# Patient Record
Sex: FEMALE | Race: BLACK OR AFRICAN AMERICAN | NOT HISPANIC OR LATINO | Employment: PART TIME | URBAN - METROPOLITAN AREA
[De-identification: names, ages, dates, MRNs, and addresses within clinical notes are randomized per-mention and may not be internally consistent; named-entity substitution may affect disease eponyms.]

---

## 2018-07-11 ENCOUNTER — OFFICE VISIT (OUTPATIENT)
Dept: UROLOGY | Facility: CLINIC | Age: 63
End: 2018-07-11
Payer: COMMERCIAL

## 2018-07-11 VITALS
HEIGHT: 65 IN | RESPIRATION RATE: 15 BRPM | DIASTOLIC BLOOD PRESSURE: 98 MMHG | BODY MASS INDEX: 39.92 KG/M2 | WEIGHT: 239.63 LBS | SYSTOLIC BLOOD PRESSURE: 142 MMHG

## 2018-07-11 DIAGNOSIS — N32.81 OAB (OVERACTIVE BLADDER): Primary | ICD-10-CM

## 2018-07-11 DIAGNOSIS — R03.0 ELEVATED BLOOD PRESSURE READING: ICD-10-CM

## 2018-07-11 DIAGNOSIS — N39.46 MIXED STRESS AND URGE URINARY INCONTINENCE: ICD-10-CM

## 2018-07-11 DIAGNOSIS — R35.1 NOCTURIA: ICD-10-CM

## 2018-07-11 PROBLEM — R39.15 URINARY URGENCY: Status: ACTIVE | Noted: 2018-07-11

## 2018-07-11 LAB
BILIRUB SERPL-MCNC: NORMAL MG/DL
BLOOD URINE, POC: NORMAL
COLOR, POC UA: YELLOW
GLUCOSE UR QL STRIP: NORMAL
KETONES UR QL STRIP: NORMAL
LEUKOCYTE ESTERASE URINE, POC: NORMAL
NITRITE, POC UA: NORMAL
PH, POC UA: NORMAL
POC RESIDUAL URINE VOLUME: 0 ML (ref 0–100)
PROTEIN, POC: NORMAL
SPECIFIC GRAVITY, POC UA: 1015
UROBILINOGEN, POC UA: 1

## 2018-07-11 PROCEDURE — 81001 URINALYSIS AUTO W/SCOPE: CPT | Mod: S$GLB,,, | Performed by: NURSE PRACTITIONER

## 2018-07-11 PROCEDURE — 3008F BODY MASS INDEX DOCD: CPT | Mod: CPTII,S$GLB,, | Performed by: NURSE PRACTITIONER

## 2018-07-11 PROCEDURE — 99999 PR PBB SHADOW E&M-NEW PATIENT-LVL III: CPT | Mod: PBBFAC,,, | Performed by: NURSE PRACTITIONER

## 2018-07-11 PROCEDURE — 99204 OFFICE O/P NEW MOD 45 MIN: CPT | Mod: 25,S$GLB,, | Performed by: NURSE PRACTITIONER

## 2018-07-11 PROCEDURE — 51798 US URINE CAPACITY MEASURE: CPT | Mod: S$GLB,,, | Performed by: NURSE PRACTITIONER

## 2018-07-11 RX ORDER — METOPROLOL TARTRATE 100 MG/1
TABLET ORAL
Refills: 1 | COMMUNITY
Start: 2018-06-27

## 2018-07-11 RX ORDER — OXYBUTYNIN CHLORIDE 5 MG/1
5 TABLET, EXTENDED RELEASE ORAL DAILY
Qty: 30 TABLET | Refills: 11 | Status: SHIPPED | OUTPATIENT
Start: 2018-07-11

## 2018-07-11 RX ORDER — GABAPENTIN 400 MG/1
CAPSULE ORAL
Refills: 2 | COMMUNITY
Start: 2018-06-24

## 2018-07-11 RX ORDER — ATORVASTATIN CALCIUM 20 MG/1
TABLET, FILM COATED ORAL
Refills: 1 | COMMUNITY
Start: 2018-05-19

## 2018-07-11 RX ORDER — ASPIRIN 81 MG/1
81 TABLET ORAL DAILY
COMMUNITY

## 2018-07-11 RX ORDER — CLONIDINE HYDROCHLORIDE 0.3 MG/1
TABLET ORAL
Refills: 1 | COMMUNITY
Start: 2018-05-15

## 2018-07-11 NOTE — PATIENT INSTRUCTIONS
Kegel Exercises  Kegel exercises dont need special clothing or equipment. Theyre easy to learn and simple to do. And if you do them right, no one can tell youre doing them, so they can be done almost anywhere. Your healthcare provider, nurse, or physical therapist can answer any questions you have and help you get started.    A weak pelvic floor   The pelvic floor muscles may weaken due to aging, pregnancy and vaginal childbirth, injury, surgery, chronic cough, or lack of exercise. If the pelvic floor is weak, your bladder and other pelvic organs may sag out of place. The urethra may also open too easily and allow urine to leak out. Kegel exercises can help you strengthen your pelvic floor muscles. Then they can better support the pelvic organs and control urine flow.  How Kegel exercises are done  Try each of the Kegel exercises described below. When youre doing them, try not to move your leg, buttock, or stomach muscles.  · Contract as if you were stopping your urine stream. But do it when youre not urinating.  · Tighten your rectum as if trying not to pass gas. Contract your anus, but dont move your buttocks.  · You may place a finger or 2 in the vagina and squeeze your finger with your vagina to learn which muscles to tighten.  Try to hold each Kegel for a slow count to 5. You probably wont be able to hold them for that long at first. But keep practicing. It will get easier as your pelvic floor gets stronger. Eventually, special weights that you place in your vagina may be recommended to help make your Kegels even more effective. Visit your healthcare provider if you have difficulties doing Kegel exercises.  Helpful hints  Here are some tips to follow:  · Do your Kegels as often as you can. The more you do them, the faster youll feel the results.  · Pick an activity you do often as a reminder. For instance, do your Kegels every time you sit down.  · Tighten your pelvic floor before you sneeze, get up  from a chair, cough, laugh, or lift. This protects your pelvic floor from injury and can help prevent urine leakage.   Date Last Reviewed: 8/5/2015  © 7058-6210 [employer]. [address] [place] [address], [place] [address]. All rights reserved. This information is not intended as a substitute for professional medical care. Always follow your healthcare professional's instructions.

## 2018-07-11 NOTE — PROGRESS NOTES
"Subjective:       Patient ID: Suzanne Gatica is a 62 y.o. female who is a new patient was self referred for evaluation of OAB/urinary incontinence    Chief Complaint:   Chief Complaint   Patient presents with    Urinary Frequency     pt c/o bladder pressure       OAB/Urinary Incontinence  Patient presents with c/o urinary urgency and frequency q 2 hrs for several years. She has not tried any medications for her symptoms. She reports worsening of urinary symptoms within the past "couple" of months. Nocturia x 1-2. She also reports occasional SHAWN/UUI--SHAWN more bothersome. She wears one pantyliner per day. Denies vaginal bulge or known pelvic organ prolapse.    She denies issues with constipation--reports daily VM. She admits to 2 cups of coffee/ day with artificial sweeteners. She also drinks diet coke occasionally.  She does not note change in urinary symptoms with intake of caffeine.  Denies intake of other bladder irritants. There is not a history of kidney stones or recurrent UTIs. She denies dysuria, gross hematuria, or flank pain. UA clear today    PVR (bladder scan) today - 0  ml    ACTIVE MEDICAL ISSUES:  Patient Active Problem List   Diagnosis    Urinary urgency    OAB (overactive bladder)    Mixed stress and urge urinary incontinence    Nocturia       PAST MEDICAL HISTORY  No past medical history on file.    PAST SURGICAL HISTORY:  No past surgical history on file.    SOCIAL HISTORY:  Social History   Substance Use Topics    Smoking status: Not on file    Smokeless tobacco: Not on file    Alcohol use Not on file       FAMILY HISTORY:  No family history on file.    ALLERGIES AND MEDICATIONS: updated and reviewed.  Review of patient's allergies indicates:   Allergen Reactions    Lisinopril Swelling     Current Outpatient Prescriptions   Medication Sig    aspirin (ECOTRIN) 81 MG EC tablet Take 81 mg by mouth once daily.    atorvastatin (LIPITOR) 20 MG tablet TAKE 1 TABLET (20 MG) BY ORAL ROUTE ONCE " "DAILY AT BEDTIME FOR 90 DAYS    cloNIDine (CATAPRES) 0.3 MG tablet TAKE 1 TABLET BY ORAL ROUTE 1 TIME PER DAY FOR 3 MONTHS AT NIGHT    gabapentin (NEURONTIN) 400 MG capsule TAKE ONE CAPSULE BY MOUTH AS NEEDED AT BEDTIME    metoprolol tartrate (LOPRESSOR) 100 MG tablet TAKE 1 TABLET BY ORAL ROUTE 2 TIMES PER DAY    olmesartan/amlodipin/hcthiazid (TRIBENZOR ORAL) Take by mouth.    oxybutynin (DITROPAN-XL) 5 MG TR24 Take 1 tablet (5 mg total) by mouth once daily.     No current facility-administered medications for this visit.        Review of Systems   Constitutional: Negative for activity change, chills, fatigue, fever and unexpected weight change.   Eyes: Negative for discharge, redness and visual disturbance.   Respiratory: Negative for cough, shortness of breath and wheezing.    Cardiovascular: Negative for chest pain and leg swelling.   Gastrointestinal: Negative for abdominal distention, abdominal pain, constipation, diarrhea, nausea and vomiting.   Genitourinary: Positive for frequency and urgency. Negative for decreased urine volume, difficulty urinating, dysuria, flank pain, hematuria and pelvic pain.   Musculoskeletal: Negative for arthralgias, joint swelling and myalgias.   Skin: Negative for color change and rash.   Neurological: Negative for dizziness and light-headedness.   Psychiatric/Behavioral: Negative for behavioral problems and confusion. The patient is not nervous/anxious.        Objective:      Vitals:    07/11/18 1137 07/11/18 1248   BP: (!) 164/110 (!) 142/98   Resp: 15    Weight: 108.7 kg (239 lb 10.2 oz)    Height: 5' 5" (1.651 m)      Physical Exam   Constitutional: She is oriented to person, place, and time. She appears well-developed.   HENT:   Head: Normocephalic and atraumatic.   Nose: Nose normal.   Eyes: Conjunctivae are normal. Right eye exhibits no discharge. Left eye exhibits no discharge.   Neck: Normal range of motion. Neck supple. No tracheal deviation present. No " thyromegaly present.   Cardiovascular: Normal rate and regular rhythm.    Pulmonary/Chest: Effort normal. No respiratory distress. She has no wheezes.   Abdominal: Soft. She exhibits no distension. There is no hepatosplenomegaly. There is no tenderness. There is no CVA tenderness. No hernia.   Genitourinary:   Genitourinary Comments: Patient declined exam   Musculoskeletal: Normal range of motion. She exhibits no edema.   Neurological: She is alert and oriented to person, place, and time.   Skin: Skin is warm and dry. No rash noted. No erythema.     Psychiatric: She has a normal mood and affect. Her behavior is normal. Judgment normal.       Urine dipstick shows negative for all components.  Micro exam: negative for WBC's or RBC's.    Assessment:       1. OAB (overactive bladder)    2. Mixed stress and urge urinary incontinence    3. Nocturia    4. Elevated blood pressure reading          Plan:       1. OAB (overactive bladder)  - POCT urinalysis, dipstick or tablet reag  - oxybutynin (DITROPAN-XL) 5 MG TR24; Take 1 tablet (5 mg total) by mouth once daily.  Dispense: 30 tablet; Refill: 11  -Avoid bladder irritants    2. Mixed stress and urge urinary incontinence   - Discussed difference of UUI and SHAWN components. Reviewed etiology and workup of each.   - SHAWN: Kegels, PFPT, pessary, bulking agent, MUS.   -Recommend Kegel exercises. Handout provided   - UUI: Behavioral changes, PFPT, anticholinergics, mirabegron. Botox/InterStim for refractory UUI   -Trial of Ditropan 5 mg.   - POCT Bladder Scan    3. Nocturia  -Limit evening fluids    4. Elevated blood pressure reading  -Patient states she took all BP meds this am.  -BP recheck--142/98. Patient instructed to recheck BP at home if remains elevated she was advised to notify PCP. Also advised to contact PCP or present to ER if headache, blurry vision, chest pain, and/or n/v develops. She verbalized understanding            Follow-up in about 6 weeks (around 8/22/2018)  for Follow up.

## 2018-07-25 ENCOUNTER — OFFICE VISIT (OUTPATIENT)
Dept: OBSTETRICS AND GYNECOLOGY | Facility: CLINIC | Age: 63
End: 2018-07-25
Payer: COMMERCIAL

## 2018-07-25 VITALS
WEIGHT: 239 LBS | BODY MASS INDEX: 39.82 KG/M2 | SYSTOLIC BLOOD PRESSURE: 141 MMHG | HEIGHT: 65 IN | DIASTOLIC BLOOD PRESSURE: 77 MMHG

## 2018-07-25 DIAGNOSIS — N84.1 CERVICAL POLYP: Primary | ICD-10-CM

## 2018-07-25 PROCEDURE — 99203 OFFICE O/P NEW LOW 30 MIN: CPT | Mod: S$GLB,,, | Performed by: OBSTETRICS & GYNECOLOGY

## 2018-07-25 PROCEDURE — 3008F BODY MASS INDEX DOCD: CPT | Mod: CPTII,S$GLB,, | Performed by: OBSTETRICS & GYNECOLOGY

## 2018-07-25 PROCEDURE — 99999 PR PBB SHADOW E&M-EST. PATIENT-LVL III: CPT | Mod: PBBFAC,,, | Performed by: OBSTETRICS & GYNECOLOGY

## 2018-07-25 NOTE — LETTER
July 25, 2018      Иван Guzman, NP  [address]  [facility] [address]           [facility]YN  [address] [facility] [address]  [facility] [address]  Phone: [phone]          Patient: Suzanne Gatica   MR Number: [id number]   YOB: 1955   Date of Visit: 7/25/2018       Dear Иван Guzman:    Thank you for referring Suzanne Gatica to me for evaluation. Attached you will find relevant portions of my assessment and plan of care.    If you have questions, please do not hesitate to call me. I look forward to following Suzanne Gatica along with you.    Sincerely,    Carlie Pickard MD    Enclosure  CC:  No Recipients    If you would like to receive this communication electronically, please contact [email or web address] or [phone] to request more information on Message Bus Link access.    For providers and/or their staff who would like to refer a patient to [facility], please contact us through our one-stop-shop provider referral line, [facility], at [phone].    If you feel you have received this communication in error or would no longer like to receive these types of communications, please e-mail [email or web address]

## 2018-07-25 NOTE — PROGRESS NOTES
SUBJECTIVE:   62 y.o. female No obstetric history on file.  for referral from PCP for mass from cervix    Patient's last menstrual period was 07/25/2008 (approximate)..    Pt had a wwe with PCP in 6/2017, per pt she was referred here for a mass from cervix  H/o PMB in 2013 s/p D&C - normal per pt. Denies  VB, denies pelvic pain  Reported h/o cervical polyp vs endometrial polyp removed at the time of D&C    OB History   Obstetric Comments   Menopause @ age 52,    H/o PMB s/p D&C in 2013     Past Medical History:   Diagnosis Date    Hypertension      Past Surgical History:   Procedure Laterality Date    HYSTEROSCOPY      VAGINAL DELIVERY       Social History     Social History    Marital status:      Spouse name: N/A    Number of children: N/A    Years of education: N/A     Occupational History    Not on file.     Social History Main Topics    Smoking status: Never Smoker    Smokeless tobacco: Never Used    Alcohol use No    Drug use: No    Sexual activity: No     Other Topics Concern    Not on file     Social History Narrative    No narrative on file     Family History   Problem Relation Age of Onset    Breast cancer Maternal Aunt          Current Outpatient Prescriptions   Medication Sig Dispense Refill    aspirin (ECOTRIN) 81 MG EC tablet Take 81 mg by mouth once daily.      atorvastatin (LIPITOR) 20 MG tablet TAKE 1 TABLET (20 MG) BY ORAL ROUTE ONCE DAILY AT BEDTIME FOR 90 DAYS  1    cloNIDine (CATAPRES) 0.3 MG tablet TAKE 1 TABLET BY ORAL ROUTE 1 TIME PER DAY FOR 3 MONTHS AT NIGHT  1    gabapentin (NEURONTIN) 400 MG capsule TAKE ONE CAPSULE BY MOUTH AS NEEDED AT BEDTIME  2    metoprolol tartrate (LOPRESSOR) 100 MG tablet TAKE 1 TABLET BY ORAL ROUTE 2 TIMES PER DAY  1    olmesartan/amlodipin/hcthiazid (TRIBENZOR ORAL) Take by mouth.      oxybutynin (DITROPAN-XL) 5 MG TR24 Take 1 tablet (5 mg total) by mouth once daily. 30 tablet 11     No current facility-administered medications for  "this visit.      Allergies: Lisinopril     ROS:  GENERAL: Denies weight gain or weight loss. Feeling well overall.   SKIN: Denies rash or lesions.   HEAD: Denies head injury or headache.   NODES: Denies enlarged lymph nodes.   CHEST: Denies chest pain or shortness of breath.   CARDIOVASCULAR: Denies palpitations or left sided chest pain.   ABDOMEN: No abdominal pain, constipation, diarrhea, nausea, vomiting or rectal bleeding.   URINARY: No frequency, dysuria, hematuria, or burning on urination.  REPRODUCTIVE: Denies vaginal discharge, abnormal vaginal bleeding, lesions, pelvic pain  BREASTS: The patient performs breast self-examination and denies pain, lumps, or nipple discharge.   HEMATOLOGIC: No easy bruisability or excessive bleeding.  MUSCULOSKELETAL: Denies joint pain or swelling.   NEUROLOGIC: Denies syncope or weakness.   PSYCHIATRIC: Denies depression, anxiety or mood swings.      OBJECTIVE:   BP (!) 141/77   Ht 5' 5" (1.651 m)   Wt 108.4 kg (239 lb)   LMP 07/25/2008 (Approximate)   BMI 39.77 kg/m²   The patient appears well, alert, oriented x 3, in no distress.  NECK: negative, no thyromegaly, trachea midline  SKIN: normal, good color, good turgor, no lesions and no acne, striae, hirsutism  BREAST EXAM: not examined  ABDOMEN: soft, non-tender; bowel sounds normal; no masses,  no organomegaly and no hernias, masses, or hepatosplenomegaly  BLADDER: soft  GENITALIA: normal external genitalia, no erythema, no discharge  URETHRA: normal appearing urethra with no masses, tenderness or lesions and normal urethra, normal urethral meatus  VAGINA: mucosal atrophy  CERVIX: cervix appears normal with 1-2 cm cervical polyp at external os, unable to palpate the base  UTERUS: normal size, contour, position, consistency, mobility, non-tender  ADNEXA: no mass, fullness, tenderness      ASSESSMENT:   1.  Cervical polyp:  Recommend in-office polypectomy  2.  rtc in 2 weeks  "

## 2018-08-06 ENCOUNTER — PROCEDURE VISIT (OUTPATIENT)
Dept: OBSTETRICS AND GYNECOLOGY | Facility: CLINIC | Age: 63
End: 2018-08-06
Payer: COMMERCIAL

## 2018-08-06 VITALS
SYSTOLIC BLOOD PRESSURE: 124 MMHG | DIASTOLIC BLOOD PRESSURE: 58 MMHG | BODY MASS INDEX: 38.67 KG/M2 | WEIGHT: 232.38 LBS

## 2018-08-06 DIAGNOSIS — N84.1 CERVICAL POLYP: Primary | ICD-10-CM

## 2018-08-06 PROCEDURE — 99024 POSTOP FOLLOW-UP VISIT: CPT | Mod: S$GLB,,, | Performed by: OBSTETRICS & GYNECOLOGY
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
## 2018-08-28 ENCOUNTER — DOCUMENTATION ONLY (OUTPATIENT)
Dept: OBSTETRICS AND GYNECOLOGY | Facility: HOSPITAL | Age: 63
End: 2018-08-28

## 2018-08-28 NOTE — PROGRESS NOTES
Pap smear result from 2005 and 2010 received and were normal.  MMG 8/2018 NEG from [facility]  No pathology report on cervical polyp

## 2018-08-29 ENCOUNTER — TELEPHONE (OUTPATIENT)
Dept: OBSTETRICS AND GYNECOLOGY | Facility: CLINIC | Age: 63
End: 2018-08-29

## 2018-08-29 NOTE — TELEPHONE ENCOUNTER
Called pt to inform her that per Dr. Pickard we were unable to retrieve the correct records. Dr. Pickard plans to biopsy the polyp in the clinic instead of going to surgery. Unable to reach pt. Santhosh for pt to call back to the office for procedure scheduling. CW

## 2018-08-30 ENCOUNTER — TELEPHONE (OUTPATIENT)
Dept: OBSTETRICS AND GYNECOLOGY | Facility: CLINIC | Age: 63
End: 2018-08-30

## 2018-08-30 NOTE — TELEPHONE ENCOUNTER
----- Message from Kristan Mcdowell sent at 8/30/2018  2:58 PM CDT -----  Contact: [facility]/ [phone]  PT returning call to office. Says she is not ready to schedule procedure at this time. Thank you.  ---------------------------------------------------  8/30/18 @ 1511 ([facility])  CALL ATTEMPT TO PT UNSUCCESSFUL , MESSAGE LEFT FOR PT TO RETURN CALL TO OFFICE CONCERNING NOT READY TO SCHEDULE PROCEDURE, LEFT PT A MESSAGE STATING WHEN SHE IS READY TO CALL US  MESSAGE SENT TO DR BERGER FOR ECHO

## 2020-11-16 DIAGNOSIS — E05.90 THYROTOXICOSIS, UNSPECIFIED WITHOUT THYROTOXIC CRISIS OR STORM: Primary | ICD-10-CM

## 2020-11-18 ENCOUNTER — TELEPHONE (OUTPATIENT)
Dept: VASCULAR SURGERY | Facility: CLINIC | Age: 65
End: 2020-11-18

## 2020-11-18 DIAGNOSIS — I73.9 PVD (PERIPHERAL VASCULAR DISEASE): Primary | ICD-10-CM

## 2020-12-09 ENCOUNTER — INITIAL CONSULT (OUTPATIENT)
Dept: VASCULAR SURGERY | Facility: CLINIC | Age: 65
End: 2020-12-09
Payer: MEDICARE

## 2020-12-09 ENCOUNTER — HOSPITAL ENCOUNTER (OUTPATIENT)
Dept: CARDIOLOGY | Facility: HOSPITAL | Age: 65
Discharge: HOME | End: 2020-12-09
Attending: SURGERY
Payer: MEDICARE

## 2020-12-09 VITALS
HEIGHT: 65 IN | SYSTOLIC BLOOD PRESSURE: 140 MMHG | DIASTOLIC BLOOD PRESSURE: 88 MMHG | WEIGHT: 238.13 LBS | BODY MASS INDEX: 39.67 KG/M2

## 2020-12-09 DIAGNOSIS — I83.813 VARICOSE VEINS OF BILATERAL LOWER EXTREMITIES WITH PAIN: ICD-10-CM

## 2020-12-09 DIAGNOSIS — I73.9 PVD (PERIPHERAL VASCULAR DISEASE): ICD-10-CM

## 2020-12-09 DIAGNOSIS — I89.0 LYMPHEDEMA OF BOTH LOWER EXTREMITIES: Primary | ICD-10-CM

## 2020-12-09 DIAGNOSIS — I87.323 CHRONIC VENOUS HYPERTENSION (IDIOPATHIC) WITH INFLAMMATION OF BILATERAL LOWER EXTREMITY: ICD-10-CM

## 2020-12-09 DIAGNOSIS — I87.303 VENOUS HYPERTENSION OF BOTH LOWER EXTREMITIES: ICD-10-CM

## 2020-12-09 PROCEDURE — 99999 PR PBB SHADOW E&M-EST. PATIENT-LVL III: CPT | Mod: PBBFAC,,, | Performed by: SURGERY

## 2020-12-09 PROCEDURE — 99204 PR OFFICE/OUTPT VISIT, NEW, LEVL IV, 45-59 MIN: ICD-10-PCS | Mod: S$GLB,,, | Performed by: SURGERY

## 2020-12-09 PROCEDURE — 93922 ANKLE BRACHIAL INDICES (ABI): ICD-10-PCS | Mod: 26,,, | Performed by: SURGERY

## 2020-12-09 PROCEDURE — 99999 PR PBB SHADOW E&M-EST. PATIENT-LVL III: ICD-10-PCS | Mod: PBBFAC,,, | Performed by: SURGERY

## 2020-12-09 PROCEDURE — 93922 UPR/L XTREMITY ART 2 LEVELS: CPT | Mod: 26,,, | Performed by: SURGERY

## 2020-12-09 PROCEDURE — 93922 UPR/L XTREMITY ART 2 LEVELS: CPT

## 2020-12-09 PROCEDURE — 99204 OFFICE O/P NEW MOD 45 MIN: CPT | Mod: S$GLB,,, | Performed by: SURGERY

## 2020-12-09 RX ORDER — ERGOCALCIFEROL 1.25 MG/1
50000 CAPSULE ORAL
COMMUNITY

## 2020-12-09 RX ORDER — METFORMIN HYDROCHLORIDE 500 MG/1
500 TABLET ORAL 2 TIMES DAILY WITH MEALS
COMMUNITY

## 2020-12-09 NOTE — PATIENT INSTRUCTIONS
Putting on Compression Stockings     Turn the stocking inside-out, then fit it over your toes and heel.          Roll the stocking up your leg.            Once stockings are on, make sure the top of the stocking is about two fingers width below the crease of the knee (or the groin if you wear thigh-high stockings).          Use equipment, such as a stocking daniel, or wear rubber gloves to make it easier to put on compression stockings.         Elastic compression stockings are prescribed to treat many vein problems. Wearing them may be the most important thing you do to manage your symptoms. The stockings fit tightly around your ankle, gradually reducing in pressure as they go up your legs. This helps keep blood flowing to your heart. As a result, swelling is reduced. Your healthcare provider will prescribe stockings at a safe pressure for you. He or she will also tell you how often to wear and remove the stockings. Follow these instructions closely. Also, do not buy or wear compression stockings without first seeing your healthcare provider.  Tips for wear and care  To wear stockings safely and to get the most benefit:  · Wear the length prescribed by your healthcare provider.  · Pull them to the designated height and no farther. Dont let them bunch at the top. This can restrict blood flow and increase swelling.  · Wear the stockings for the amount of time your healthcare provider recommends. Replace them when they start to feel loose. This will likely be every 3 to 6 months.  · Remove them as your healthcare provider directs. When removed, wash your legs. Then check your legs and feet for sores. Call your healthcare provider if you find a sore. Dont put the stockings back on unless your healthcare provider directs.   · Wash the stockings as instructed. They may need to be hand-washed.  Date Last Reviewed: 5/1/2016  © 1524-7744 [employer]. [address] [place] [address], [place] [address]. All rights  reserved. This information is not intended as a substitute for professional medical care. Always follow your healthcare professional's instructions.        Understanding Chronic Venous Insufficiency  Problems with the veins in the legs may lead to chronic venous insufficiency (CVI). CVI means that there is a long-term problem with the veins not being able to pump blood back to your heart. When this happens, blood stays in the legs and causes swelling and aching.   Two problems that may lead to chronic venous insufficiency are:  · Damaged valves. Valves keep blood flowing from the legs through the blood vessels and back to the heart. When the valves are damaged, blood does not flow as well.   · Deep vein thrombosis (DVT). Blood clots may form in the deep veins of the legs. This may cause pain, redness, and swelling in the legs. It may also block the flow of blood back to the heart. Seek immediate medical care if you have these symptoms.  · A blood clot in the leg can also break off and travel to the lungs. This is called pulmonary embolism (PE). In the lungs, the clot can cut off the flow of blood. This may cause chest pain, trouble breathing, sweating, a fast heartbeat, coughing (may cough up blood), and fainting. It is a medical emergency and may cause death. Call 911 if you have these symptoms.  · Healthcare providers call the two conditions, DVT and PE, venous thromboembolism (VTE).  CVI cant be cured, but you can control leg swelling to reduce the likelihood of ulcers (sores).  Recognizing the symptoms  Be aware of the following:  · If you stand or sit with your feet down for long periods, your legs may ache or feel heavy.  · Swollen ankles are possibly the most common symptom of CVI.  · As swelling increases, the skin over your ankles may show red spots or a brownish tinge. The skin may feel leathery or scaly, and may start to itch.  · If swelling is not controlled, an ulcer (open wound) may form.  What you  can do  Reduce your risk of developing ulcers by doing the following:  · Increase blood flow back to your heart by elevating your legs, exercising daily, and wearing elastic stockings.  · Boost blood flow in your legs by losing excess weight.  · If you must stand or sit in one place for a period of time, keep your blood moving by wiggling your toes, shifting your body position, and rising up on the balls of your feet.    Date Last Reviewed: 5/1/2016 © 2000-2017 [employer]. [address] [place] [address], [place] [address]. All rights reserved. This information is not intended as a substitute for professional medical care. Always follow your healthcare professional's instructions.        Tips for Using Less Salt    Most people with heart problems need to eat less salt (sodium). Reducing the amount of salt you eat may help control your blood pressure. The higher your blood pressure, the greater your risk for heart disease, stroke, blindness, and kidney problems.  At the store  · Make low-salt choices by reading labels carefully. Look for the total amount of sodium per serving.  · Use more fresh food. Buy more fruits and vegetables. Select lean meats, fish, and poultry.  · Use fewer frozen, canned, and packaged foods which often contain a lot of sodium.  · Use plain frozen vegetables without sauces or toppings. These products are often low- or no-sodium.  · Opt for reduced-sodium or no-salt-added versions of canned vegetables and soups.  In the kitchen  · Don't add salt to food when you're cooking. Season with flavorings such as onion, garlic, pepper, salt-free herbal blends, and lemon or lime juice.  · Use a cookbook containing low-salt recipes. It can give you ideas for tasty meals that are healthy for your heart.  · Sprinkle salt-free herbal blends on vegetables and meat.  · Drain and rinse canned foods, such as canned beans and vegetables, before cooking or eating.  Eating out  · Tell the  you're on a  low-salt diet. Ask questions about the menu.  · Order fish, chicken, and meat broiled, baked, poached, or grilled without salt, butter, or breading.  · Use lemon, pepper, and salt-free herb mixes to add flavor.  · Choose plain steamed rice, boiled noodles, and baked or boiled potatoes. Top potatoes with chives and a little sour cream.     Beware! Salt goes by many other names. Limit foods with these words listed as ingredients: salt, sodium, soy sauce, baking soda, baking powder, MSG, monosodium, Na (the chemical symbol for sodium). Some antacids are also high in salt.   Date Last Reviewed: 6/19/2015 © 2000-2017 [employer]. [address] [place] [address]. All rights reserved. This information is not intended as a substitute for professional medical care. Always follow your healthcare professional's instructions.        Low-Salt Diet  This diet removes foods that are high in salt. It also limits the amount of salt you use when cooking. It is most often used for people with high blood pressure, edema (fluid retention), and kidney, liver, or heart disease.  Table salt contains the mineral sodium. Your body needs sodium to work normally. But too much sodium can make your health problems worse. Your healthcare provider is recommending a low-salt (also called low-sodium) diet for you. Your total daily allowance of salt is 1,500 to 2,300 milligrams (mg). It is less than 1 teaspoon of table salt. This means you can have only about 500 to 700 mg of sodium at each meal. People with certain health problems should limit salt intake to the lower end of the recommended range.    When you cook, dont add much salt. If you can cook without using salt, even better. Dont add salt to your food at the table.  When shopping, read food labels. Salt is often called sodium on the label. Choose foods that are salt-free, low salt, or very low salt. Note that foods with reduced salt may not lower your salt intake  enough.    Beans, potatoes, and pasta  Ok: Dry beans, split peas, lentils, potatoes, rice, macaroni, pasta, spaghetti without added salt  Avoid: Potato chips, tortilla chips, and similar products  Breads and cereals  Ok: Low-sodium breads, rolls, cereals, and cakes; low-salt crackers, matzo crackers  Avoid: Salted crackers, pretzels, popcorn, Frisian toast, pancakes, muffins  Dairy  Ok: Milk, chocolate milk, hot chocolate mix, low-salt cheeses, and yogurt  Avoid: Processed cheese and cheese spreads; Roquefort, Camembert, and cottage cheese; buttermilk, instant breakfast drink  Desserts  Ok: Ice cream, frozen yogurt, juice bars, gelatin, cookies and pies, sugar, honey, jelly, hard candy  Avoid: Most pies, cakes and cookies prepared or processed with salt; instant pudding  Drinks  Ok: Tea, coffee, fizzy (carbonated) drinks, juices  Avoid: Flavored coffees, electrolyte replacement drinks, sports drinks  Meats  Ok: All fresh meat, fish, poultry, low-salt tuna, eggs, egg substitute  Avoid: Smoked, pickled, brine-cured, or salted meats and fish. This includes murdock, chipped beef, corned beef, hot dogs, deli meats, ham, kosher meats, salt pork, sausage, canned tuna, salted codfish, smoked salmon, herring, sardines, or anchovies.  Seasonings and spices  Ok: Most seasonings are okay. Good substitutes for salt include: fresh herb blends, hot sauce, lemon, garlic, escobar, vinegar, dry mustard, parsley, cilantro, horseradish, tomato paste, regular margarine, mayonnaise, unsalted butter, cream cheese, vegetable oil, cream, low-salt salad dressing and gravy.  Avoid: Regular ketchup, relishes, pickles, soy sauce, teriyaki sauce, Worcestershire sauce, BBQ sauce, tartar sauce, meat tenderizer, chili sauce, regular gravy, regular salad dressing, salted butter  Soups  Ok: Low-salt soups and broths made with allowed foods  Avoid: Bouillon cubes, soups with smoked or salted meats, regular soup and broth  Vegetables  Ok: Most vegetables  are okay; also low-salt tomato and vegetable juices  Avoid: Sauerkraut and other brine-soaked vegetables; pickles and other pickled vegetables; tomato juice, olives  Date Last Reviewed: 8/1/2016 © 2000-2017 [employer]. [address] [place] [address]. All rights reserved. This information is not intended as a substitute for professional medical care. Always follow your healthcare professional's instructions.        Low-Salt Choices  Eating salt (sodium) can make your body retain too much water. Excess water makes your heart work harder. Canned, packaged, and frozen foods are easy to prepare, but they are often high in sodium. Here are some ideas for low-salt foods you can easily prepare yourself.    For breakfast  · Fruit or 100% fruit juice  · Whole-wheat bread or an English muffin. Compare sodium content on labels.  · Low-fat milk or yogurt  · Unsalted eggs  · Shredded wheat  · Corn tortillas  · Unsalted steamed rice  · Regular (not instant) hot cereal, made without salt  Stay away from:  · Sausage, murdock, and ham  · Flour tortillas  · Packaged muffins, pancakes, and biscuits  · Instant hot cereals  · Cottage cheese  For lunch and dinner  · Fresh fish, chicken, turkey, or meat--baked, broiled, or roasted without salt  · Dry beans, cooked without salt  · Tofu, stir-fried without salt  · Unsalted fresh fruit and vegetables, or frozen or canned fruit and vegetables with no added salt  Stay away from:  · Lunch or deli meat that is cured or smoked  · Cheese  · Tomato juice and catsup  · Canned vegetables, soups, and fish not labeled as no-salt-added or reduced sodium  · Packaged gravies and sauces  · Olives, pickles, and relish  · Bottled salad dressings  For snacks and desserts  · Yogurt  · Unsalted, air popped popcorn  · Unsalted nuts or seeds  Stay away from:  · Pies and cakes  · Packaged dessert mixes  · Pizza  · Canned and packaged puddings  · Pretzels, chips, crackers, and nuts--unless  the label says unsalted  Date Last Reviewed: 6/17/2015  © 9708-3506 [employer]. [address] [place] [address], [place] [address]. All rights reserved. This information is not intended as a substitute for professional medical care. Always follow your healthcare professional's instructions.          Lymphedema  The lymphatic system is made up of lymph vessels and lymph nodes, which carry a fluid called lymph. Lymph consists of waste from the cells. This fluid drains through lymph vessels under the skin to nearby lymph nodes. Lymph nodes filter waste products from the cells and kill any bacteria present before returning the lymph fluid to your blood circulation.  When the lymph vessels are damaged, lymph fluid cannot drain from tissues. This causes the lymph fluid to back up leading to swelling. This most often affects the arms or legs. Signs of lymphedema include heaviness, stiffness, or aching in an arm or leg. The limb may swell. The skin might look red. Shoes and rings may feel tight. Ankles and wrists might become less flexible.  The most common cause of damage to the lymph system is surgery or radiation for breast or testicular cancer. Other causes include repeated skin infections (cellulitis), burns, or injury to the arms or legs. It can take many years for symptoms of lymphedema to appear. Once present, lymphedema can become a chronic condition. This means the problem can be managed but not cured.   Treatment often includes use of compression garments, massage, and special exercises. Talk to your healthcare provider about these therapies and best treatment plan for you.  Home care  You can help keep the condition from getting worse. Follow all instructions you have been given. Do your exercises and wear your compression garments as recommended. Also, care for yourself as instructed.   · Small skin injuries like a cut, burn, or insect bite are more likely to cause a skin infection. Take special care to  avoid injury. If you have any signs of infection, call your healthcare provider right away.  · Take care of your skin and nails. Moisturize dry skin. Wear protective gloves when doing chores such as gardening.   · Don't wear tight clothing or jewelry on the affected arm or leg. Avoid carrying bags or other weight on the affected arm.  · Save with an electric razor instead of a razor blade.  · If at all possible, dont have blood pressure taken, get injections, or have blood drawn in the affected arm.  · If a leg is involved, dont cross your legs when sitting. Don't go barefoot.  · Avoid hot tubs, steam rooms, and saunas.  If you are at risk for lymphedema but have not developed it, these tips can help also help prevent it. Follow your healthcare provider's instructions.  Follow-up care  Follow up with your healthcare provider, or as advised.  Lymphedema can change the appearance of your body. This can be emotionally difficult to adjust to. You may benefit from a support group where practical advice and emotional support is offered. Individual counseling is another option.  When to seek medical advice  Call your healthcare provider right away if any of these occur:  · Swelling worsens  · Rash, blistering, or other skin changes on the affected limb  · Area of skin becomes red, painful, or warm to the touch  · A wound increases in pain, becomes warm, drains pus, or radiates red streaks  · Fever of 100.4°F (38°C) or higher, or as directed by your healthcare provider  Date Last Reviewed: 10/1/2016  © 2907-0078 [employer]. [address] [place] [address]. All rights reserved. This information is not intended as a substitute for professional medical care. Always follow your healthcare professional's instructions.          Managing Lymphedema After Cancer    Lymphedema is a problem that may occur after cancer surgery when lymph nodes are removed, or after radiation to the lymph nodes. Lymphedema  can occur months or even many years after cancer treatment. It is a chronic (ongoing) condition that has no cure. But steps can be taken to reduce or relieve symptoms. If left untreated, lymphedema can get worse. Treatment can lower your risk of infections and complications.  Understanding lymphedema  The lymphatic system helps the body fight infection. It is made up of a system of small vessels throughout the body. Lymph fluid travels through these vessels. Many tiny organs called lymph nodes are scattered through the system. These nodes filter lymph fluid. During surgery for cancer, nearby lymph nodes are often removed. Sometimes radiation is used to treat lymph nodes as part of cancer treatment. Both of these disrupt the flow of lymph fluid, which can lead to swelling. This is lymphedema. Lymphedema can affect one or both arms or legs, the genitals, or the belly, depending on the part of the body treated. Swelling can worsen and become severe. Skin sores or other problems can develop. Affected areas are also more likely to become infected.  Lymphedema treatment  There are no medications currently used to treat lymphedema. Instead, the most common treatment for lymphedema is complete decongestive therapy (CDT). This is a set of techniques used together to reduce your symptoms. CDT is done by trained therapists. To help show how well the treatment is working, your arms or legs may be measured before and after CDT. The treatment most often involves one or more of the following:  · Manual lymphatic drainage. This is a kind of massage that uses gentle pressure to help move lymph out of areas where it is collecting. It is done by a therapist or nurse with special training. It can also be learned and done at home.  · Intermittent pneumatic compression. This uses a device to apply and relieve pressure to the arms or legs. Sleeves are put over the arms or legs. A pump fills the sleeves with air. Then the air is let out.  This happens many times in a row.  · Compression bandages. This means wearing stretchy or padded fabric on the parts of the body with lymphedema. This may include bandages, tape, or other types of compression wraps. They help support your tissues so lymph can flow more freely. And they help prevent fluid lymph from building up.  · Therapeutic exercises. Some kinds of exercise may help your symptoms. These may include aerobic exercise, such as brisk walking. And may also include gradual weight-lifting exercises that build muscle.  · Skin and nail care. Proper care of your skin and nails will help prevent infection. See the Preventing Infection for Life box for more information.  · Compression garments. These are worn as often as needed, for life. These include sleeves, gloves, stockings, undershirt, or other types of special clothes. They compress parts of the body to help prevent lymph buildup. You may wear these during daily life, or at night when youre asleep.     Tips for living with lymphedema  · Avoid becoming too cold or too hot. This can cause the skin to swell and dry out. It can also cause more fluid to build up. Be careful around hot objects to avoid burns. Dont use hot tubs, saunas, or a heating pad.  · Avoid anything that squeezes the affected area. This may cause more swelling. Wear loose clothing and jewelry. If your legs are affected, dont wear tight socks or undergarments, and don't  cross your legs when you sit. This can block lymph drainage.  · Avoid weight gain. This can make your symptoms worse.  · Inform health care providers. If your arms are affected, tell your health care providers about your lymphedema before getting shots, an IV, or having your blood pressure taken.  · Call the doctor right awayt if you have  ¨ Fever of 100.4ºF (38ºC) or higher, or as directed by your healthcare provider  ¨ Signs of infection such as red blotches, warmth, or pain  ¨ Sudden increase in swelling    Preventing infection for life  An important part of staying healthy with lymphedema is preventing infections in the areas that are swollen. Lymphedema makes it easier for bacteria to grow in those areas. To help prevent infection:  · Keep your skin clean, and moisturize it with lotion  · Be extra careful when shaving, and use a clean razor on clean skin  · Check your skin regularly for cuts, sores, bug bites, or other problems  · Use an antibacterial ointment if you have a cut or sore  · Don't pick at, or cut the skin around your fingernails. Use a cuticle stick to push your cuticles back.  · Trim your fingernails and toenails straight across to prevent ingrown nails  · If at all possible, don't allow blood to be taken or shots given in any affected limb  · Prevent skin burns by wearing sunscreen and using gloves when cooking or doing household work  · Wear shoes that fit well and do not cause blisters  · Use an insect repellent to avoid bug bites when outdoors.   Date Last Reviewed: 9/24/2015 © 2000-2017 [employer]. [address] [place] [address]. All rights reserved. This information is not intended as a substitute for professional medical care. Always follow your healthcare professional's instructions.

## 2020-12-09 NOTE — LETTER
December 9, 2020      Cristina Barrios, HECTOR  [address] [facility]  [address]  [facility] [address]           [facility]  [address]  [facility] [address]  Phone: [phone]  Fax: [phone]          Patient: Suzanne Gatica   MR Number: [id number]   YOB: 1955   Date of Visit: 12/9/2020       Dear Dr. Cristina Barrios:    Thank you for referring Suzanne Gatica to me for evaluation. Attached you will find relevant portions of my assessment and plan of care.    If you have questions, please do not hesitate to call me. I look forward to following Suzanne Gatica along with you.    Sincerely,    Frank Ibanez MD    Enclosure  CC:  No Recipients    If you would like to receive this communication electronically, please contact [email or web address] or [phone] to request more information on [employer] access.    For providers and/or their staff who would like to refer a patient to [facility], please contact us through our one-stop-shop provider referral line, [facility], at [phone].    If you feel you have received this communication in error or would no longer like to receive these types of communications, please e-mail [email or web address]

## 2020-12-09 NOTE — PROGRESS NOTES
Frank Ibanez MD, RPVI                                 [facility]                           [facility]                             12/09/2020    HPI:  Suzanne Gatica is a 65 y.o. female with   Patient Active Problem List   Diagnosis    Urinary urgency    OAB (overactive bladder)    Mixed stress and urge urinary incontinence    Nocturia    Cervical polyp    being managed by PCP and specialists who is here today for evaluation of BLE edema.  Patient states location is BLE occurring for yrs.  Associated signs and symptoms include skin changes.  Quality is heavy and severity is 3/10.  Symptoms began yrs ago.  Alleviating factors include elevation.  Worsening factors include dependency.  Patient is wearing compression stockings daily, OTC.  No FH of venous disease.  Symptoms do limit patient's functional status and daily activities.  No DVT history.  No venous interventions.  No low sodium diet.  No excessive water intake.    Migraine with aura: no  PFO/ASD/right to left shunt: no  Pregnant: no  Breastfeeding: no    no MI  no Stroke  Tobacco use: denies    Past Medical History:   Diagnosis Date    Hypertension      Past Surgical History:   Procedure Laterality Date    HYSTEROSCOPY      VAGINAL DELIVERY       Family History   Problem Relation Age of Onset    Breast cancer Maternal Aunt      Social History     Socioeconomic History    Marital status:      Spouse name: Not on file    Number of children: Not on file    Years of education: Not on file    Highest education level: Not on file   Occupational History    Not on file   Social Needs    Financial resource strain: Not on file    Food insecurity     Worry: Not on file     Inability: Not on file    Transportation needs     Medical: Not on file     Non-medical: Not on file   Tobacco Use    Smoking status: Never Smoker    Smokeless tobacco: Never Used   Substance and Sexual Activity    Alcohol use: No     Drug use: No    Sexual activity: Never   Lifestyle    Physical activity     Days per week: Not on file     Minutes per session: Not on file    Stress: Not on file   Relationships    Social connections     Talks on phone: Not on file     Gets together: Not on file     Attends Sabianism service: Not on file     Active member of club or organization: Not on file     Attends meetings of clubs or organizations: Not on file     Relationship status: Not on file   Other Topics Concern    Not on file   Social History Narrative    Not on file       Current Outpatient Medications:     aspirin (ECOTRIN) 81 MG EC tablet, Take 81 mg by mouth once daily., Disp: , Rfl:     atorvastatin (LIPITOR) 20 MG tablet, TAKE 1 TABLET (20 MG) BY ORAL ROUTE ONCE DAILY AT BEDTIME FOR 90 DAYS, Disp: , Rfl: 1    cloNIDine (CATAPRES) 0.3 MG tablet, TAKE 1 TABLET BY ORAL ROUTE 1 TIME PER DAY FOR 3 MONTHS AT NIGHT, Disp: , Rfl: 1    ergocalciferol (VITAMIN D2) 50,000 unit Cap, Take 50,000 Units by mouth every 7 days., Disp: , Rfl:     metFORMIN (GLUCOPHAGE) 500 MG tablet, Take 500 mg by mouth 2 (two) times daily with meals., Disp: , Rfl:     metoprolol tartrate (LOPRESSOR) 100 MG tablet, TAKE 1 TABLET BY ORAL ROUTE 2 TIMES PER DAY, Disp: , Rfl: 1    olmesartan/amlodipin/hcthiazid (TRIBENZOR ORAL), Take by mouth., Disp: , Rfl:     gabapentin (NEURONTIN) 400 MG capsule, TAKE ONE CAPSULE BY MOUTH AS NEEDED AT BEDTIME, Disp: , Rfl: 2    oxybutynin (DITROPAN-XL) 5 MG TR24, Take 1 tablet (5 mg total) by mouth once daily. (Patient not taking: Reported on 12/9/2020), Disp: 30 tablet, Rfl: 11    REVIEW OF SYSTEMS:  General: No fevers or chills; ENT: No sore throat; Allergy and Immunology: no persistent infections; Hematological and Lymphatic: No history of bleeding or easy bruising; Endocrine: negative; Respiratory: no cough, shortness of breath, or wheezing; Cardiovascular: no chest pain or dyspnea on exertion; Gastrointestinal: no abdominal  pain/back, change in bowel habits, or bloody stools; Genito-Urinary: no dysuria, trouble voiding, or hematuria; Musculoskeletal: edema; Neurological: no TIA or stroke symptoms; Psychiatric: no nervousness, anxiety or depression.    PHYSICAL EXAM:                General appearance:  Alert, well-appearing, and in no distress.  Oriented to person, place, and time                    Neurological: Normal speech, no focal findings noted; CN II - XII grossly intact. RLE with sensation to light touch, LLE with sensation to light touch.            Musculoskeletal: Digits/nail without cyanosis/clubbing.  Strength 5/5 BLE.                    Neck: Supple, no significant adenopathy                  Chest:  No wheezes, symmetric air entry. No use of accessory muscles               Cardiac: Normal rate and regular rhythm            Abdomen: Soft, nontender, nondistended      Extremities:   2+ R DP pulse, 2+ L DP pulse      2+ RLE edema, 2+ LLE edema    Skin:  RLE no ulcer; LLE no ulcer      RLE no spider veins, LLE no spider veins      RLE no varicose veins, LLE no varicose veins    CEAP 3/3    LAB RESULTS:  No results found for: CBC  No results found for: LABPROT, INR  No results found for: NA, K, CL, CO2, GLU, BUN, CREATININE, CALCIUM, ANIONGAP, EGFRNONAA  No results found for: WBC, RBC, HGB, HCT, MCV, MCH, MCHC, RDW, PLT, MPV, GRAN, LYMPH, MONO, EOS, BASO, GRAN, EOSINOPHIL, BASOPHIL, DIFFMETHOD  .No results found for: HGBA1C    IMAGING:  All pertinent imaging has been reviewed and interpreted independently.    KIANNA 2020:  No HD significant stenosis BLE    IMP/PLAN:  65 y.o. female with   Patient Active Problem List   Diagnosis    Urinary urgency    OAB (overactive bladder)    Mixed stress and urge urinary incontinence    Nocturia    Cervical polyp    being managed by PCP and specialists who is here today for evaluation of BLE edema.    -recommend compression with Rx stockings, elevation, dietary changes associated with  water and sodium intake discussed at length with patient  -Exercise   -venous reflux US  -Lymphedema clinic and pumps  -RTC 3 months for further evaluation    I spent 12 minutes evaluating this patient and greater than 50% of the time was spent counseling, coordinator care and discussing the plan of care.  All questions were answered and patient stated understanding with agreement with the above treatment plan.    Frank Ibanez MD [facility]  Vascular and Endovascular Surgery

## 2020-12-14 ENCOUNTER — TELEPHONE (OUTPATIENT)
Dept: VASCULAR SURGERY | Facility: CLINIC | Age: 65
End: 2020-12-14

## 2020-12-15 LAB
LEFT ABI: 1.14
LEFT ARM BP: 146 MMHG
LEFT DORSALIS PEDIS: 166 MMHG
LEFT POSTERIOR TIBIAL: 162 MMHG
LEFT TBI: 0.71
LEFT TOE PRESSURE: 104 MMHG
RIGHT ABI: 1.01
RIGHT ARM BP: 139 MMHG
RIGHT DORSALIS PEDIS: 140 MMHG
RIGHT POSTERIOR TIBIAL: 148 MMHG
RIGHT TBI: 0.79
RIGHT TOE PRESSURE: 115 MMHG

## 2021-01-04 ENCOUNTER — TELEPHONE (OUTPATIENT)
Dept: VASCULAR SURGERY | Facility: CLINIC | Age: 66
End: 2021-01-04

## 2021-03-16 ENCOUNTER — TELEPHONE (OUTPATIENT)
Dept: VASCULAR SURGERY | Facility: CLINIC | Age: 66
End: 2021-03-16

## 2021-04-21 ENCOUNTER — TELEPHONE (OUTPATIENT)
Dept: VASCULAR SURGERY | Facility: CLINIC | Age: 66
End: 2021-04-21

## 2021-04-22 ENCOUNTER — OFFICE VISIT (OUTPATIENT)
Dept: VASCULAR SURGERY | Facility: CLINIC | Age: 66
End: 2021-04-22
Payer: MEDICARE

## 2021-04-22 VITALS
SYSTOLIC BLOOD PRESSURE: 163 MMHG | BODY MASS INDEX: 39.51 KG/M2 | DIASTOLIC BLOOD PRESSURE: 83 MMHG | HEIGHT: 65 IN | WEIGHT: 237.13 LBS

## 2021-04-22 DIAGNOSIS — I89.0 LYMPHEDEMA OF BOTH LOWER EXTREMITIES: ICD-10-CM

## 2021-04-22 DIAGNOSIS — I87.303 VENOUS HYPERTENSION OF BOTH LOWER EXTREMITIES: Primary | ICD-10-CM

## 2021-04-22 DIAGNOSIS — I87.323 CHRONIC VENOUS HYPERTENSION (IDIOPATHIC) WITH INFLAMMATION OF BILATERAL LOWER EXTREMITY: ICD-10-CM

## 2021-04-22 PROCEDURE — 99999 PR PBB SHADOW E&M-EST. PATIENT-LVL III: ICD-10-PCS | Mod: PBBFAC,,, | Performed by: SURGERY

## 2021-04-22 PROCEDURE — 99999 PR PBB SHADOW E&M-EST. PATIENT-LVL III: CPT | Mod: PBBFAC,,, | Performed by: SURGERY

## 2021-04-22 PROCEDURE — 3008F BODY MASS INDEX DOCD: CPT | Mod: CPTII,S$GLB,, | Performed by: SURGERY

## 2021-04-22 PROCEDURE — 1126F AMNT PAIN NOTED NONE PRSNT: CPT | Mod: S$GLB,,, | Performed by: SURGERY

## 2021-04-22 PROCEDURE — 99214 OFFICE O/P EST MOD 30 MIN: CPT | Mod: S$GLB,,, | Performed by: SURGERY

## 2021-04-22 PROCEDURE — 1101F PT FALLS ASSESS-DOCD LE1/YR: CPT | Mod: CPTII,S$GLB,, | Performed by: SURGERY

## 2021-04-22 PROCEDURE — 3008F PR BODY MASS INDEX (BMI) DOCUMENTED: ICD-10-PCS | Mod: CPTII,S$GLB,, | Performed by: SURGERY

## 2021-04-22 PROCEDURE — 3288F FALL RISK ASSESSMENT DOCD: CPT | Mod: CPTII,S$GLB,, | Performed by: SURGERY

## 2021-04-22 PROCEDURE — 3288F PR FALLS RISK ASSESSMENT DOCUMENTED: ICD-10-PCS | Mod: CPTII,S$GLB,, | Performed by: SURGERY

## 2021-04-22 PROCEDURE — 1101F PR PT FALLS ASSESS DOC 0-1 FALLS W/OUT INJ PAST YR: ICD-10-PCS | Mod: CPTII,S$GLB,, | Performed by: SURGERY

## 2021-04-22 PROCEDURE — 1126F PR PAIN SEVERITY QUANTIFIED, NO PAIN PRESENT: ICD-10-PCS | Mod: S$GLB,,, | Performed by: SURGERY

## 2021-04-22 PROCEDURE — 99214 PR OFFICE/OUTPT VISIT, EST, LEVL IV, 30-39 MIN: ICD-10-PCS | Mod: S$GLB,,, | Performed by: SURGERY

## 2021-04-23 ENCOUNTER — TELEPHONE (OUTPATIENT)
Dept: VASCULAR SURGERY | Facility: CLINIC | Age: 66
End: 2021-04-23

## 2021-07-29 ENCOUNTER — HOSPITAL ENCOUNTER (OUTPATIENT)
Dept: CARDIOLOGY | Facility: HOSPITAL | Age: 66
Discharge: HOME | End: 2021-07-29
Attending: SURGERY
Payer: MEDICARE

## 2021-07-29 ENCOUNTER — OFFICE VISIT (OUTPATIENT)
Dept: VASCULAR SURGERY | Facility: CLINIC | Age: 66
End: 2021-07-29
Payer: MEDICARE

## 2021-07-29 VITALS
WEIGHT: 237.38 LBS | DIASTOLIC BLOOD PRESSURE: 78 MMHG | HEIGHT: 65 IN | SYSTOLIC BLOOD PRESSURE: 142 MMHG | BODY MASS INDEX: 39.55 KG/M2

## 2021-07-29 DIAGNOSIS — I83.813 VARICOSE VEINS OF BILATERAL LOWER EXTREMITIES WITH PAIN: ICD-10-CM

## 2021-07-29 DIAGNOSIS — I87.303 VENOUS HYPERTENSION OF BOTH LOWER EXTREMITIES: Primary | ICD-10-CM

## 2021-07-29 DIAGNOSIS — I89.0 LYMPHEDEMA OF BOTH LOWER EXTREMITIES: ICD-10-CM

## 2021-07-29 DIAGNOSIS — I87.303 VENOUS HYPERTENSION OF BOTH LOWER EXTREMITIES: ICD-10-CM

## 2021-07-29 PROCEDURE — 99999 PR PBB SHADOW E&M-EST. PATIENT-LVL III: CPT | Mod: PBBFAC,,, | Performed by: SURGERY

## 2021-07-29 PROCEDURE — 93970 CV US LOWER VENOUS INSUFFICIENCY BILATERAL (CUPID ONLY): ICD-10-PCS | Mod: 26,,, | Performed by: SURGERY

## 2021-07-29 PROCEDURE — 1126F AMNT PAIN NOTED NONE PRSNT: CPT | Mod: CPTII,S$GLB,, | Performed by: SURGERY

## 2021-07-29 PROCEDURE — 1159F MED LIST DOCD IN RCRD: CPT | Mod: CPTII,S$GLB,, | Performed by: SURGERY

## 2021-07-29 PROCEDURE — 3077F PR MOST RECENT SYSTOLIC BLOOD PRESSURE >= 140 MM HG: ICD-10-PCS | Mod: CPTII,S$GLB,, | Performed by: SURGERY

## 2021-07-29 PROCEDURE — 3078F DIAST BP <80 MM HG: CPT | Mod: CPTII,S$GLB,, | Performed by: SURGERY

## 2021-07-29 PROCEDURE — 3008F BODY MASS INDEX DOCD: CPT | Mod: CPTII,S$GLB,, | Performed by: SURGERY

## 2021-07-29 PROCEDURE — 93970 EXTREMITY STUDY: CPT | Mod: 26,,, | Performed by: SURGERY

## 2021-07-29 PROCEDURE — 3008F PR BODY MASS INDEX (BMI) DOCUMENTED: ICD-10-PCS | Mod: CPTII,S$GLB,, | Performed by: SURGERY

## 2021-07-29 PROCEDURE — 99214 PR OFFICE/OUTPT VISIT, EST, LEVL IV, 30-39 MIN: ICD-10-PCS | Mod: S$GLB,,, | Performed by: SURGERY

## 2021-07-29 PROCEDURE — 99214 OFFICE O/P EST MOD 30 MIN: CPT | Mod: S$GLB,,, | Performed by: SURGERY

## 2021-07-29 PROCEDURE — 1126F PR PAIN SEVERITY QUANTIFIED, NO PAIN PRESENT: ICD-10-PCS | Mod: CPTII,S$GLB,, | Performed by: SURGERY

## 2021-07-29 PROCEDURE — 99999 PR PBB SHADOW E&M-EST. PATIENT-LVL III: ICD-10-PCS | Mod: PBBFAC,,, | Performed by: SURGERY

## 2021-07-29 PROCEDURE — 3078F PR MOST RECENT DIASTOLIC BLOOD PRESSURE < 80 MM HG: ICD-10-PCS | Mod: CPTII,S$GLB,, | Performed by: SURGERY

## 2021-07-29 PROCEDURE — 1160F RVW MEDS BY RX/DR IN RCRD: CPT | Mod: CPTII,S$GLB,, | Performed by: SURGERY

## 2021-07-29 PROCEDURE — 3077F SYST BP >= 140 MM HG: CPT | Mod: CPTII,S$GLB,, | Performed by: SURGERY

## 2021-07-29 PROCEDURE — 93970 EXTREMITY STUDY: CPT | Mod: TC

## 2021-07-29 PROCEDURE — 1160F PR REVIEW ALL MEDS BY PRESCRIBER/CLIN PHARMACIST DOCUMENTED: ICD-10-PCS | Mod: CPTII,S$GLB,, | Performed by: SURGERY

## 2021-07-29 PROCEDURE — 1159F PR MEDICATION LIST DOCUMENTED IN MEDICAL RECORD: ICD-10-PCS | Mod: CPTII,S$GLB,, | Performed by: SURGERY

## 2021-08-01 LAB
LEFT GREAT SAPHENOUS DISTAL THIGH DIA: 0.3 CM
LEFT GREAT SAPHENOUS JUNCTION DIA: 0.8 CM
LEFT GREAT SAPHENOUS KNEE DIA: 0.3 CM
LEFT GREAT SAPHENOUS MIDDLE THIGH DIA: 0.4 CM
LEFT GREAT SAPHENOUS PROXIMAL CALF DIA: 0.2 CM
LEFT SMALL SAPHENOUS KNEE DIA: 0.2 CM
LEFT SMALL SAPHENOUS SPJ DIA: 0.3 CM
RIGHT GREAT SAPHENOUS DISTAL THIGH DIA: 0.4 CM
RIGHT GREAT SAPHENOUS JUNCTION DIA: 0.6 CM
RIGHT GREAT SAPHENOUS KNEE DIA: 4 CM
RIGHT GREAT SAPHENOUS MIDDLE THIGH DIA: 0.4 CM
RIGHT GREAT SAPHENOUS PROXIMAL CALF DIA: 0.2 CM
RIGHT SMALL SAPHENOUS KNEE DIA: 0.2 CM
RIGHT SMALL SAPHENOUS SPJ DIA: 0.3 CM

## 2023-11-21 ENCOUNTER — TELEPHONE (OUTPATIENT)
Dept: OBSTETRICS AND GYNECOLOGY | Facility: CLINIC | Age: 68
End: 2023-11-21
Payer: MEDICARE

## 2024-01-24 ENCOUNTER — OFFICE VISIT (OUTPATIENT)
Dept: OBSTETRICS AND GYNECOLOGY | Facility: CLINIC | Age: 69
End: 2024-01-24
Payer: MEDICARE

## 2024-01-24 VITALS — SYSTOLIC BLOOD PRESSURE: 138 MMHG | WEIGHT: 220 LBS | DIASTOLIC BLOOD PRESSURE: 78 MMHG | BODY MASS INDEX: 36.61 KG/M2

## 2024-01-24 DIAGNOSIS — Z13.820 OSTEOPOROSIS SCREENING: ICD-10-CM

## 2024-01-24 DIAGNOSIS — Z12.4 ENCOUNTER FOR PAPANICOLAOU SMEAR FOR CERVICAL CANCER SCREENING: ICD-10-CM

## 2024-01-24 DIAGNOSIS — Z01.419 WELL WOMAN EXAM WITH ROUTINE GYNECOLOGICAL EXAM: Primary | ICD-10-CM

## 2024-01-24 DIAGNOSIS — N84.1 CERVICAL POLYP: ICD-10-CM

## 2024-01-24 DIAGNOSIS — Z12.31 BREAST CANCER SCREENING BY MAMMOGRAM: ICD-10-CM

## 2024-01-24 DIAGNOSIS — N95.9 UNSPECIFIED MENOPAUSAL AND PERIMENOPAUSAL DISORDER: ICD-10-CM

## 2024-01-24 PROCEDURE — 4010F ACE/ARB THERAPY RXD/TAKEN: CPT | Mod: CPTII,S$GLB,, | Performed by: OBSTETRICS & GYNECOLOGY

## 2024-01-24 PROCEDURE — G0101 CA SCREEN;PELVIC/BREAST EXAM: HCPCS | Mod: S$GLB,,, | Performed by: OBSTETRICS & GYNECOLOGY

## 2024-01-24 PROCEDURE — 3075F SYST BP GE 130 - 139MM HG: CPT | Mod: CPTII,S$GLB,, | Performed by: OBSTETRICS & GYNECOLOGY

## 2024-01-24 PROCEDURE — 99999 PR PBB SHADOW E&M-EST. PATIENT-LVL III: CPT | Mod: PBBFAC,,, | Performed by: OBSTETRICS & GYNECOLOGY

## 2024-01-24 PROCEDURE — 87624 HPV HI-RISK TYP POOLED RSLT: CPT | Performed by: OBSTETRICS & GYNECOLOGY

## 2024-01-24 PROCEDURE — 3288F FALL RISK ASSESSMENT DOCD: CPT | Mod: CPTII,S$GLB,, | Performed by: OBSTETRICS & GYNECOLOGY

## 2024-01-24 PROCEDURE — 88175 CYTOPATH C/V AUTO FLUID REDO: CPT | Performed by: OBSTETRICS & GYNECOLOGY

## 2024-01-24 PROCEDURE — 1101F PT FALLS ASSESS-DOCD LE1/YR: CPT | Mod: CPTII,S$GLB,, | Performed by: OBSTETRICS & GYNECOLOGY

## 2024-01-24 PROCEDURE — 3078F DIAST BP <80 MM HG: CPT | Mod: CPTII,S$GLB,, | Performed by: OBSTETRICS & GYNECOLOGY

## 2024-01-24 RX ORDER — EMPAGLIFLOZIN 10 MG/1
10 TABLET, FILM COATED ORAL
COMMUNITY
Start: 2024-01-22

## 2024-01-24 NOTE — PROGRESS NOTES
SUBJECTIVE:   Suzanne Gatica is a 68 y.o. female No obstetric history on file.  for annual well woman exam. Patient's last menstrual period was 07/25/2008 (approximate)..      Menopause @ 52 age, denies HRT, denies VB  Pt with known cervical mass, uncertain of polyp vs. Cyst  Attempt was removed in office in 2018 but was unsuccessful she was counseled for EUA and removed in OR but no show and pandemic happens    Pt reported h/o polyp in the past, required D&C  Denies vaginal bleeding otherwise    Past Medical History:   Diagnosis Date    Hypertension      Past Surgical History:   Procedure Laterality Date    HYSTEROSCOPY      VAGINAL DELIVERY       Social History     Socioeconomic History    Marital status:    Tobacco Use    Smoking status: Never    Smokeless tobacco: Never   Substance and Sexual Activity    Alcohol use: No    Drug use: No    Sexual activity: Never     Family History   Problem Relation Age of Onset    Breast cancer Maternal Aunt      OB History   Obstetric Comments   Menopause @ age 52,    Pap Neg 2005 and 2010   H/o PMB s/p D&C in 2013   MMG 8/2018 NEG from [facility]         Current Outpatient Medications   Medication Sig Dispense Refill    JARDIANCE 10 mg tablet Take 10 mg by mouth.      aspirin (ECOTRIN) 81 MG EC tablet Take 81 mg by mouth once daily.      atorvastatin (LIPITOR) 20 MG tablet TAKE 1 TABLET (20 MG) BY ORAL ROUTE ONCE DAILY AT BEDTIME FOR 90 DAYS  1    cloNIDine (CATAPRES) 0.3 MG tablet TAKE 1 TABLET BY ORAL ROUTE 1 TIME PER DAY FOR 3 MONTHS AT NIGHT  1    ergocalciferol (VITAMIN D2) 50,000 unit Cap Take 50,000 Units by mouth every 7 days.      gabapentin (NEURONTIN) 400 MG capsule TAKE ONE CAPSULE BY MOUTH AS NEEDED AT BEDTIME  2    metFORMIN (GLUCOPHAGE) 500 MG tablet Take 500 mg by mouth 2 (two) times daily with meals.      metoprolol tartrate (LOPRESSOR) 100 MG tablet TAKE 1 TABLET BY ORAL ROUTE 2 TIMES PER DAY  1    olmesartan/amlodipin/hcthiazid (TRIBENZOR ORAL) Take by mouth.       oxybutynin (DITROPAN-XL) 5 MG TR24 Take 1 tablet (5 mg total) by mouth once daily. (Patient not taking: Reported on 4/22/2021) 30 tablet 11     No current facility-administered medications for this visit.     Allergies: Lisinopril       ROS:  GENERAL: Denies weight gain or weight loss. Feeling well overall.   SKIN: Denies rash or lesions.   HEAD: Denies head injury or headache.   NODES: Denies enlarged lymph nodes.   CHEST: Denies chest pain or shortness of breath.   CARDIOVASCULAR: Denies palpitations or left sided chest pain.   ABDOMEN: No abdominal pain, constipation, diarrhea, nausea, vomiting or rectal bleeding.   URINARY: No frequency, dysuria, hematuria, or burning on urination.  REPRODUCTIVE: Denies vaginal discharge, abnormal vaginal bleeding, lesions, pelvic pain  BREASTS: The patient performs breast self-examination and denies pain, lumps, or nipple discharge.   HEMATOLOGIC: No easy bruisability or excessive bleeding.  MUSCULOSKELETAL: Denies joint pain or swelling.   NEUROLOGIC: Denies syncope or weakness.   PSYCHIATRIC: Denies depression, anxiety or mood swings.      OBJECTIVE:   /78   Wt 99.8 kg (220 lb 0.3 oz)   LMP 07/25/2008 (Approximate)   BMI 36.61 kg/m²   The patient appears well, alert, oriented x 3, in no distress.  PSYCH:  Normal, full range of affect  NECK: negative, no thyromegaly, trachea midline  SKIN: normal, good color, good turgor and no acne, striae, hirsutism  BREAST EXAM: breasts appear normal, no suspicious masses, no skin or nipple changes or axillary nodes  ABDOMEN: soft, non-tender; bowel sounds normal; no masses,  no organomegaly and no hernias, masses, or hepatosplenomegaly  GENITALIA: normal external genitalia, no erythema, no discharge  BLADDER: soft  URETHRA: normal appearing urethra with no masses, tenderness or lesions and normal urethra, normal urethral meatus  VAGINA: Normal  CERVIX: no lesions or cervical motion tenderness and cervical lesion/polyp appx  3-4 cm. Appeared larger than before  UTERUS: normal size, contour, position, consistency, mobility, non-tender  ADNEXA: no mass, fullness, tenderness      ASSESSMENT:   Paxton was seen today for well woman.    Diagnoses and all orders for this visit:    Well woman exam with routine gynecological exam    Encounter for Papanicolaou smear for cervical cancer screening  -     Liquid-Based Pap Smear, Screening  -     HPV High Risk Genotypes, PCR    Breast cancer screening by mammogram  -     Mammo Digital Screening Bilat w/ Eduardo; Future    Osteoporosis screening  -     DXA Bone Density Axial Skeleton 1 or more sites; Future    Unspecified menopausal and perimenopausal disorder  -     DXA Bone Density Axial Skeleton 1 or more sites; Future    Cervical polyp  -     Case Request Operating Room: POLYPECTOMY, UTERUS, HYSTEROSCOPIC        1. Health maintenance  -pap done. Discussed ASCCP guideline screening every 3 - 5 years.   -screening MMG ordered  -counseled on exercise and healthy diet, weight loss  -bone health:  Discussed Vitamin D and Calcium supplementation, weight bearing exercises  2.  Discussed safety at home/school/work, healthy and balanced diet, sleep hygiene, as well as violence/weapons exposure.   3.  Cervical lesion: plan for h'scope polypectomy, D&C on 3/5/24.  Check pelvic US and preop with PCP if need

## 2024-01-24 NOTE — LETTER
January 24, 2024    Suzanne Gatica  [address] [place] [address]             [facility]  [address]   [facility] [place] [address]  Phone: [phone] To whom it may concern,    Suzanne Gatica is have a [facility] HYSTEROSCOPY on 3/5/2024. Do Ms. Palacios need a medical clearance in order for her to have this procedure? If so, can you guys clear her for the procedure?    If you have any questions or concerns, please don't hesitate to call.    Sincerely,        Carlie Pickard MD

## 2024-01-29 LAB
FINAL PATHOLOGIC DIAGNOSIS: NORMAL
Lab: NORMAL

## 2024-01-30 ENCOUNTER — HOSPITAL ENCOUNTER (OUTPATIENT)
Dept: RADIOLOGY | Facility: HOSPITAL | Age: 69
Discharge: HOME | End: 2024-01-30
Attending: OBSTETRICS & GYNECOLOGY
Payer: MEDICARE

## 2024-01-30 DIAGNOSIS — N84.1 CERVICAL POLYP: ICD-10-CM

## 2024-01-30 LAB
HPV HR 12 DNA SPEC QL NAA+PROBE: NEGATIVE
HPV16 AG SPEC QL: NEGATIVE
HPV18 DNA SPEC QL NAA+PROBE: NEGATIVE

## 2024-01-30 PROCEDURE — 76830 TRANSVAGINAL US NON-OB: CPT | Mod: 26,,, | Performed by: RADIOLOGY

## 2024-01-30 PROCEDURE — 76856 US EXAM PELVIC COMPLETE: CPT | Mod: 26,,, | Performed by: RADIOLOGY

## 2024-01-30 PROCEDURE — 76830 TRANSVAGINAL US NON-OB: CPT | Mod: TC

## 2024-02-01 ENCOUNTER — TELEPHONE (OUTPATIENT)
Dept: OBSTETRICS AND GYNECOLOGY | Facility: CLINIC | Age: 69
End: 2024-02-01
Payer: MEDICARE

## 2024-02-01 NOTE — TELEPHONE ENCOUNTER
Pt was called and informed of her results.        ----- Message from [facility] Hospital[facility] Mai, MD sent at 1/31/2024 12:59 PM CST -----  Please inform normal pap

## 2024-02-01 NOTE — PROGRESS NOTES
The ultrasound confirmed the lesion only in the cervix. Uterus is normal.  We will still proceed with surgery as planned

## 2024-02-02 ENCOUNTER — TELEPHONE (OUTPATIENT)
Dept: OBSTETRICS AND GYNECOLOGY | Facility: CLINIC | Age: 69
End: 2024-02-02
Payer: MEDICARE

## 2024-02-02 NOTE — TELEPHONE ENCOUNTER
Pt requesting to know if polypectomy is necessary. Pt possibly does not want to have procedure if not necessary. Pt requesting provider to call her in reference.      ----- Message from Jian Felder sent at 2/2/2024 11:52 AM CST -----  Regarding: Self [phone]  Type:  Patient Returning Call    Who Called:  Self     Who Left Message for Patient:  Satish Tadeo MA    Does the patient know what this is regarding?: yes     Would the patient rather a call back or a response via [facility]?  Call back     Best Call Back Number: [phone]     Additional Information:     Thank you.

## 2024-02-20 ENCOUNTER — TELEPHONE (OUTPATIENT)
Dept: OBSTETRICS AND GYNECOLOGY | Facility: CLINIC | Age: 69
End: 2024-02-20

## 2024-02-22 ENCOUNTER — TELEPHONE (OUTPATIENT)
Dept: OBSTETRICS AND GYNECOLOGY | Facility: CLINIC | Age: 69
End: 2024-02-22
Payer: MEDICARE

## 2024-02-22 NOTE — TELEPHONE ENCOUNTER
Return Pt call in regards to her paperwork for her Clearance for Surgery.. Spoken with Dr Pickard whom stated to let know she can come in on 2/26 @10:45 for her biopsy and we can take of the clearance @ a later time kg

## 2024-02-23 ENCOUNTER — TELEPHONE (OUTPATIENT)
Dept: OBSTETRICS AND GYNECOLOGY | Facility: CLINIC | Age: 69
End: 2024-02-23
Payer: MEDICARE

## 2024-02-23 ENCOUNTER — TELEPHONE (OUTPATIENT)
Dept: PREADMISSION TESTING | Facility: HOSPITAL | Age: 69
End: 2024-02-23
Payer: MEDICARE

## 2024-02-23 NOTE — TELEPHONE ENCOUNTER
----- Message from Mei Alva sent at 2/23/2024  2:29 PM CST -----  Regarding: [phone]  Type: Patient Call Back    Who called:self     What is the request in detail: pt states she would like to cancel her procedure       Can the clinic reply by [facility]?no     Would the patient rather a call back or a response via [facility]? Call back    Best call back number:[phone]       Additional Information: